# Patient Record
(demographics unavailable — no encounter records)

---

## 2025-05-14 NOTE — HISTORY OF PRESENT ILLNESS
[de-identified] : Bernadette Squires, a 31-year-old female, presents with her  for a consultation regarding weight management. She has a history of anxiety, particularly experiencing panic attacks following her mother-in-law's death from breast cancer. Bernadette is a mother of three children and currently has a BMI of approximately 34, standing at 5'4" and weighing 196 pounds. She recounts her weight history, starting at 145 pounds when she got , followed by weight gain after marriage and subsequent pregnancies. After her second child, she reached 190 pounds but managed to lose about 50 pounds. However, she regained weight and has struggled to maintain weight loss since her third pregnancy two years ago. Bernadette has previously tried GLP analogs and has been under the care of a psychologist. She exercises regularly, but her weight issues contribute significantly to her anxiety. During the physical examination, Bernadette displayed normal blood pressure and heart rate, with no acute distress. She presented a pleasant demeanor but expressed extreme frustration with her weight. The consultation included a detailed discussion about her health status, emphasizing that her female hormones currently offer some protection against heart disease. While the presence of metabolic syndrome increases certain health risks, it was explained that for her class 1 to 2 obesity, bariatric surgery is generally safe, effective, and offers a higher likelihood of weight loss compared to endoscopic sleeve gastroplasty (ESG) and medications. The risks, benefits, and alternatives were thoroughly discussed. Both Bernadette and her  expressed interest in proceeding with surgery on a cash pay basis. A note from her psychologist confirming her readiness for the procedure was requested. As Bernadette does not suffer from gastroesophageal reflux disease, an upper GI series was deemed unnecessary. A dietician will be contacted to provide additional support. The procedure is planned to take place at [facility] in the near future, with all risks and benefits having been explained.

## 2025-05-14 NOTE — ASSESSMENT
[FreeTextEntry1] : Bernadette Squires, a 31-year-old female, presents with her  for a consultation regarding weight management. She has a history of anxiety, particularly experiencing panic attacks following her mother-in-law's death from breast cancer. Bernadette is a mother of three children and currently has a BMI of approximately 34, standing at 5'4" and weighing 196 pounds. She recounts her weight history, starting at 145 pounds when she got , followed by weight gain after marriage and subsequent pregnancies. After her second child, she reached 190 pounds but managed to lose about 50 pounds. However, she regained weight and has struggled to maintain weight loss since her third pregnancy two years ago. Bernadette has previously tried GLP analogs and has been under the care of a psychologist. She exercises regularly, but her weight issues contribute significantly to her anxiety. During the physical examination, Bernadette displayed normal blood pressure and heart rate, with no acute distress. She presented a pleasant demeanor but expressed extreme frustration with her weight. The consultation included a detailed discussion about her health status, emphasizing that her female hormones currently offer some protection against heart disease. While the presence of metabolic syndrome increases certain health risks, it was explained that for her class 1 to 2 obesity, bariatric surgery is generally safe, effective, and offers a higher likelihood of weight loss compared to endoscopic sleeve gastroplasty (ESG) and medications. The risks, benefits, and alternatives were thoroughly discussed. Both Bernadette and her  expressed interest in proceeding with surgery on a cash pay basis. A note from her psychologist confirming her readiness for the procedure was requested. As Bernadette does not suffer from gastroesophageal reflux disease, an upper GI series was deemed unnecessary. A dietician will be contacted to provide additional support. The procedure is planned to take place at [facility] in the near future, with all risks and benefits having been explained.

## 2025-06-06 NOTE — HISTORY OF PRESENT ILLNESS
[de-identified] : Bernadette Squires is a 31-year-old female presents today for a possible final visit prior to bariatric surgery. We educated her on the importance of lifestyle changes after surgery including a healthy diet with high fiber/protein diet and regular exercise. We also discussed that surgery alone is unlikely to be successful but should rather be seen as a tool for weight loss to be integrated with physical activity and nutritional counseling. Additionally, pre- op and post-op instructions were given. The procedure was discussed in great detail including the risks of surgery such as leaks, blood clots, and death. All questions were answered, and the patient has no other concerns at this time. No contraindication to proceed. The patient is scheduled for sleeve gastrectomy on 06/12/25.

## 2025-06-06 NOTE — ASSESSMENT
[FreeTextEntry1] : Bernadette Squires is a 31-year-old female presenting today for a possible final visit prior to bariatric surgery. We educated her on the importance of lifestyle changes after surgery including a healthy diet with high fiber/protein diet and regular exercise. We also discussed that surgery alone is unlikely to be successful but should rather be seen as a tool for weight loss to be integrated with physical activity and nutritional counseling. Additionally, pre- op and post-op instructions were given. The procedure was discussed in great detail including the risks of surgery such as leaks, blood clots, and death. All questions were answered, and the patient has no other concerns at this time. No contraindication to proceed. The patient is scheduled for sleeve gastrectomy on 06/12/25.

## 2025-06-06 NOTE — ADDENDUM
[FreeTextEntry1] :   Documented by Allyssa Ta acting as a scribe for Dr. Travis Pantoja  on 06/06/2025  .

## 2025-06-06 NOTE — REASON FOR VISIT
[Follow-Up Visit] : a follow-up visit for [Morbid Obesity (BMI<40)] : morbid obesity (bmi<40) [Home] : at home, [unfilled] , at the time of the visit. [Medical Office: ([facility])___] : at the medical office located in  [Telehealth (audio & video)] : This visit was provided via telehealth using real-time 2-way audio visual technology. [Verbal consent obtained from patient] : the patient, [unfilled]

## 2025-06-06 NOTE — END OF VISIT
[Time Spent: ___ minutes] : I have spent [unfilled] minutes of time on the encounter which excludes teaching and separately reported services. [FreeTextEntry3] : All medical record entries made by the Scribe were at my, Dr. Travis Pantoja , direction and personally dictated by me on 06/06/2025 . I have reviewed the chart and agree that the record accurately reflects my personal performance of the history, physical exam, assessment and plan. I have also personally directed, reviewed, and agreed with the chart.

## 2025-06-27 NOTE — ASSESSMENT
[FreeTextEntry1] : Brenadette Squires is a 32-year-old female 2 weeks s/p sleeve gastrectomy returns here for a follow-up visit. Patient states that she is doing well overall and denies pain, n/v, fevers, calf pain, dizziness, chest pain, shortness of breath, acid reflux or PO intolerance. Patient appears well and is in excellent overall health today. Patient reports positive progress since surgery. Her self-reported weight today is 188lbs. Will follow up in 6 weeks.

## 2025-06-27 NOTE — ADDENDUM
[FreeTextEntry1] :   Documented by Allyssa Ta acting as a scribe for Dr. Travis Pantoja  on 06/27/2025  .

## 2025-06-27 NOTE — HISTORY OF PRESENT ILLNESS
[Home] : at home, [unfilled] , at the time of the visit. [Medical Office: ([facility])___] : at the medical office located in  [Telehealth (audio & video)] : This visit was provided via telehealth using real-time 2-way audio visual technology. [Verbal consent obtained from patient] : the patient, [unfilled] [de-identified] : Bernadette Squires is a 32-year-old female 2 weeks s/p sleeve gastrectomy returns here for a follow-up visit. Patient states that she is doing well overall and denies pain, n/v, fevers, calf pain, dizziness, chest pain, shortness of breath, acid reflux or PO intolerance. Patient appears well and is in excellent overall health today. Patient reports positive progress since surgery. Her self-reported weight today is 188lbs. Will follow up in 6 weeks.

## 2025-06-27 NOTE — END OF VISIT
[FreeTextEntry3] : All medical record entries made by the Scribe were at my, Dr. Travis Pantoja , direction and personally dictated by me on 06/27/2025 . I have reviewed the chart and agree that the record accurately reflects my personal performance of the history, physical exam, assessment and plan. I have also personally directed, reviewed, and agreed with the chart.  [Time Spent: ___ minutes] : I have spent [unfilled] minutes of time on the encounter which excludes teaching and separately reported services.